# Patient Record
Sex: MALE | Race: OTHER | NOT HISPANIC OR LATINO | URBAN - METROPOLITAN AREA
[De-identification: names, ages, dates, MRNs, and addresses within clinical notes are randomized per-mention and may not be internally consistent; named-entity substitution may affect disease eponyms.]

---

## 2020-11-23 ENCOUNTER — EMERGENCY (EMERGENCY)
Facility: HOSPITAL | Age: 30
LOS: 1 days | Discharge: HOME | End: 2020-11-23
Attending: EMERGENCY MEDICINE
Payer: MEDICAID

## 2020-11-23 VITALS
WEIGHT: 190.04 LBS | HEART RATE: 84 BPM | HEIGHT: 65 IN | SYSTOLIC BLOOD PRESSURE: 133 MMHG | OXYGEN SATURATION: 98 % | RESPIRATION RATE: 16 BRPM | DIASTOLIC BLOOD PRESSURE: 86 MMHG | TEMPERATURE: 98 F

## 2020-11-23 VITALS
OXYGEN SATURATION: 99 % | SYSTOLIC BLOOD PRESSURE: 135 MMHG | DIASTOLIC BLOOD PRESSURE: 74 MMHG | TEMPERATURE: 98 F | RESPIRATION RATE: 15 BRPM | HEART RATE: 80 BPM

## 2020-11-23 PROCEDURE — 73110 X-RAY EXAM OF WRIST: CPT

## 2020-11-23 PROCEDURE — 99284 EMERGENCY DEPT VISIT MOD MDM: CPT

## 2020-11-23 PROCEDURE — 73070 X-RAY EXAM OF ELBOW: CPT | Mod: 26,LT

## 2020-11-23 PROCEDURE — 73090 X-RAY EXAM OF FOREARM: CPT

## 2020-11-23 PROCEDURE — 73070 X-RAY EXAM OF ELBOW: CPT

## 2020-11-23 PROCEDURE — 73090 X-RAY EXAM OF FOREARM: CPT | Mod: 26,LT

## 2020-11-23 PROCEDURE — 73110 X-RAY EXAM OF WRIST: CPT | Mod: 26,LT

## 2020-11-23 RX ORDER — OXYCODONE HYDROCHLORIDE 5 MG/1
1 TABLET ORAL
Qty: 4 | Refills: 0
Start: 2020-11-23 | End: 2020-11-24

## 2020-11-23 NOTE — ED PROVIDER NOTE - OBJECTIVE STATEMENT
31 y/o M with no significant pmhx presents s/p mechanical fall. Pt tried jumping over metal chain but his foot got caught. Pt landed on his arms. Pt c/o of severe pain in the medial aspect of his L elbow. Pt says he has a hard time pronating or supinating his L hand. Pt says he did not break skin at all. Denies head trauma. Denies fevers, chills, nausea, vomiting, chest pain, sob, abd pain, diarrhea.

## 2020-11-23 NOTE — ED ADULT NURSE NOTE - NSIMPLEMENTINTERV_GEN_ALL_ED
Implemented All Universal Safety Interventions:  [place] to call system. Call bell, personal items and telephone within reach. Instruct patient to call for assistance. Room bathroom lighting operational. Non-slip footwear when patient is off stretcher. Physically safe environment: no spills, clutter or unnecessary equipment. Stretcher in lowest position, wheels locked, appropriate side rails in place.

## 2020-11-23 NOTE — ED PROVIDER NOTE - NS ED ROS FT
CONSTITUTIONAL - No fever, No diaphoresis, No weight change  SKIN - No rash  HEMATOLOGIC - No abnormal bleeding or bruising  EYES - No eye pain, No blurred vision  ENT - No change in hearing, No sore throat, No neck pain, No rhinorrhea, No ear pain  RESPIRATORY - No shortness of breath, No cough  CARDIAC -No chest pain, No palpitations  GI - No abdominal pain, No nausea, No vomiting, No diarrhea, No constipation  - No dysuria, no frequency, no hematuria.   MUSCULOSKELETAL +pain L elbow   NEUROLOGIC - No numbness, No focal weakness, No headache, No dizziness

## 2020-11-23 NOTE — ED PROVIDER NOTE - CLINICAL SUMMARY MEDICAL DECISION MAKING FREE TEXT BOX
Tasha DO PGY-1: 31 y/o M presents s/p mechanical fall. Pt c/o of pain in L elbow, L wrist, L snuffbox. Ordered XR's to r/o fx. Tasha DO PGY-1: 31 y/o M presents s/p mechanical fall. Pt c/o of pain in L elbow, L wrist, L snuffbox. Ordered XR's to r/o fx.  Attending Shayla Gonzalez: 31 y/o male presenting after fall with left elbow and left wrist pain. no further evidence of traumatic injur yon exam. will obtain xrays, pain control and re-eval. GCS 15, no head trauma. primary survey intact

## 2020-11-23 NOTE — ED PROVIDER NOTE - ATTENDING CONTRIBUTION TO CARE
Attending MD Shayla Gonzalez:  I personally have seen and examined this patient.  Resident note reviewed and agree on plan of care and except where noted.  See HPI, PE, and MDM for details.

## 2020-11-23 NOTE — ED PROVIDER NOTE - PATIENT PORTAL LINK FT
You can access the FollowMyHealth Patient Portal offered by [facility] by registering at the following website: [email or web address]. By joining [employer]’s FollowMyHealth portal, you will also be able to view your health information using other applications (apps) compatible with our system.

## 2020-11-23 NOTE — ED PROVIDER NOTE - PROGRESS NOTE DETAILS
30 year old M signed out to me by Dr. CRUZ Gonzalez at 16:30 to f/u X-ray of elbow, forearm, and wrist. Pt found to have radial head fracture with moderate elbow joint effusion. D/w ortho resident on call who advised that pt does not require splint and can be DC with sling and orthopedics f/u.

## 2020-11-23 NOTE — ED PROVIDER NOTE - PHYSICAL EXAMINATION
CONSTITUTIONAL: Well-developed; well-nourished; in no acute distress.   SKIN: warm, dry  HEAD: Normocephalic; atraumatic.  EYES: no conjunctival injection. PERRL.   ENT: No nasal discharge; airway clear.  NECK: Supple; non tender.  CARD: S1, S2 normal; no murmurs, gallops, or rubs. Regular rate and rhythm.   RESP: No wheezes, rales or rhonchi. Good air movement bilaterally.   ABD: soft ntnd, no guarding, no distention, no rigidity.   EXT: Ambulates independently.  TTP around L snuffbox, TTP around L wrist TTP medial condyle of L elbow. Lipoma seen on L forearm. Radial pulses intact 2/2 b/l. Sensation intact b/l, Equal  strength b/l.   NEURO: Alert, oriented, grossly unremarkable  PSYCH: Cooperative, appropriate.

## 2020-11-23 NOTE — ED PROVIDER NOTE - NSFOLLOWUPCLINICS_GEN_ALL_ED_FT
[facility] Orthopedic [facility]  Orthopedic Surgery  [address], [address]rd & [address]th [place] [address]  Phone: [phone]  Fax:   Follow Up Time: Urgent

## 2020-11-23 NOTE — ED PROVIDER NOTE - NSFOLLOWUPINSTRUCTIONS_ED_ALL_ED_FT
(1) Follow up with your primary care physician as discussed. In addition, we did not find evidence of a life threatening illness on your testing here today, but listed below are the specialists that will be necessary to see as an outpatient to continue the workup.  Please call the numbers listed below or [phone] to set up the necessary appointments.  (2) Immediately seek care at your nearest emergency room if your symptoms worsen, persist, or do not resolve   (3) Take Tylenol and/or Motrin as needed for pain per the dosing instructions on the bottle.   (4) Take the prescribed medication as instructed:. Do not drive when taking oxycodone      Radial Head Fracture  A radial head fracture is a break in the smaller bone in your forearm (radius). The break happens near the end of the bone at the elbow joint. There are two bones in your forearm. The radius, or radial bone, is the bone on the side of your thumb.      Contact a doctor if:  •You have problems with your splint.  •You have pain or swelling that gets worse.      Get help right away if:  •You have very bad pain.  •You have fluid or a bad smell coming from your splint.  •Your hand or fingers get cold or turn pale or blue.  •You lose feeling in any part of your hand or arm.

## 2020-11-23 NOTE — ED ADULT NURSE NOTE - OBJECTIVE STATEMENT
29yo M with no pmh or drug allergies. states he had a mechanical trip and fall this morning outside on the concrete. fell onto L arm. did not hit his head or experience LOC. pain is from left elbow, down to forearm wrist and hand. pain exacerbated by movement. pulses present and strong. skin warm. digits mobile but limited due to pain. denies shoulder pain, no gross deformity noted to shoulder. able to remove shirt with assistance. no gross deformity or open wounds noted to arms or hands. assessment is ongoing. pt is alert, oriented x 3 with steady gait, able to make needs known. call bell in reach. pending MD euceda

## 2020-11-25 ENCOUNTER — APPOINTMENT (OUTPATIENT)
Dept: ORTHOPEDIC SURGERY | Facility: HOSPITAL | Age: 30
End: 2020-11-25

## 2020-11-25 ENCOUNTER — OUTPATIENT (OUTPATIENT)
Dept: OUTPATIENT SERVICES | Facility: HOSPITAL | Age: 30
LOS: 1 days | End: 2020-11-25
Payer: MEDICAID

## 2020-11-25 VITALS
WEIGHT: 202 LBS | HEART RATE: 66 BPM | HEIGHT: 66 IN | TEMPERATURE: 97.5 F | SYSTOLIC BLOOD PRESSURE: 136 MMHG | DIASTOLIC BLOOD PRESSURE: 88 MMHG | BODY MASS INDEX: 32.47 KG/M2

## 2020-11-25 DIAGNOSIS — M79.643 PAIN IN UNSPECIFIED HAND: ICD-10-CM

## 2020-11-25 DIAGNOSIS — S52.122A DISPLACED FRACTURE OF HEAD OF LEFT RADIUS, INITIAL ENCOUNTER FOR CLOSED FRACTURE: ICD-10-CM

## 2020-11-25 PROBLEM — Z00.00 ENCOUNTER FOR PREVENTIVE HEALTH EXAMINATION: Status: ACTIVE | Noted: 2020-11-25

## 2020-11-25 PROCEDURE — G0463: CPT

## 2020-11-30 DIAGNOSIS — S52.122A DISPLACED FRACTURE OF HEAD OF LEFT RADIUS, INITIAL ENCOUNTER FOR CLOSED FRACTURE: ICD-10-CM

## 2020-12-16 ENCOUNTER — APPOINTMENT (OUTPATIENT)
Dept: ORTHOPEDIC SURGERY | Facility: HOSPITAL | Age: 30
End: 2020-12-16

## 2023-12-08 NOTE — ED ADULT TRIAGE NOTE - WEIGHT IN KG
Peripheral IV  Date/Time: 12/8/2023 7:05 AM  Inserted by: JENNI Stewart-CRNA    Placement  Needle size: 20 G  Laterality: right  Location: hand  Local anesthetic: none  Site prep: chlorhexidine  Technique: anatomical landmarks  Attempts: 2         86.2